# Patient Record
Sex: FEMALE | Race: OTHER
[De-identification: names, ages, dates, MRNs, and addresses within clinical notes are randomized per-mention and may not be internally consistent; named-entity substitution may affect disease eponyms.]

---

## 2017-04-10 NOTE — PDOC
History of Present Illness





- General


Chief Complaint: Cold Symptoms


Stated Complaint: RAPID HEARTBEAT, FEVER


Time Seen by Provider: 04/10/17 13:17


History Source: Patient


Exam Limitations: No Limitations





- History of Present Illness


Initial Comments: 





04/10/17 15:01


Complaint: Sore throat





Patient is a 21-year-old female with 1 day of fever, sore throat. Son has 

strep. Patient went to her doctor but because her heart rate was elevated they 

sent her to the ER. Patient has no difficulty speaking and can swallow. Patient 

does not appear ill





GENERAL/CONSTITUTIONAL: +fever, weakness. No: dizziness


HEAD, EYES, EARS, NOSE AND THROAT: No change in vision. No ear pain or 

discharge. +sore throat.


CARDIOVASCULAR: No chest pain


RESPIRATORY: No shortness of breath or cough


GASTROINTESTINAL: No pain, nausea, vomiting, diarrhea or constipation


GENITOURINARY: No dysuria


MUSCULOSKELETAL:  No neck or back pain


SKIN: No rash


NEUROLOGIC: No headache, vertigo, loss of consciousness, or loss of sensation.








GENERAL: The patient is awake, alert, and fully oriented, in no acute distress.


HEAD: Normal with no signs of trauma.


EYES: Pupils equal, round and reactive to light, sclera anicteric, conjunctiva 

clear.


ENT: pharynx: mild erythema, no exudate, uvula midline


NECK: supple


CHEST: clear, nontender, rr


ABD: soft, nontender


EXTREMITIES: Normal range of motion, no edema.


NEUROLOGICAL: Normal speech, normal gait.


SKIN: Warm, Dry





Past History





- Past Medical History


Allergies/Adverse Reactions: 


 Allergies











Allergy/AdvReac Type Severity Reaction Status Date / Time


 


No Known Allergies Allergy   Verified 04/10/17 12:21











Home Medications: 


Ambulatory Orders





Amoxicillin - [Amoxicillin 875mg Tablet -] 875 mg PO BID #20 tablet 04/10/17 








Anemia: Yes (with past pregnancy)


Asthma: No


Cancer: No


Cardiac Disorders: No


CVA: No


COPD: No


CHF: No


Dementia: No


Diabetes: No


GI Disorders: No


 Disorders: No


HTN: No


Hypercholesterolemia: No


Liver Disease: No


Seizures: No


Thyroid Disease: No





- Surgical History


Abdominal Surgery: No


Appendectomy: No


Cardiac Surgery: No


Cholecystectomy: No


Lung Surgery: No


Neurologic Surgery: No


Orthopedic Surgery: No





- Reproductive History


 (#): 5


Para: 1


Cervical CA: No


Dysfunctional Uterine Bleeding: No


Ectopic Pregnancy: No


Endometrial CA: No


Polycystic Ovaries: No


Therapeutic (s) & number: Yes (3)


Tubal Ligation: No


Spontaneous : 1





- Immunization History


Immunization Up to Date: Yes





- Psycho/Social/Smoking Cessation Hx


Anxiety: No (DENIES)


Suicidal Ideation: No


Smoking Status: No


Smoking History: Never smoked


Have you smoked in the past 12 months: No


Number of Cigarettes Smoked Daily: 0


Information on smoking cessation initiated: No


Hx Alcohol Use: No


Drug/Substance Use Hx: No


Substance Use Type: None





*Physical Exam





- Vital Signs


 Last Vital Signs











Temp Pulse Resp BP Pulse Ox


 


 100.3 F H  118 H  18   140/73   100 


 


 04/10/17 13:20  04/10/17 13:20  04/10/17 12:22  04/10/17 12:22  04/10/17 12:22














ED Treatment Course





- Medications


Given in the ED: 


ED Medications














Discontinued Medications














Generic Name Dose Route Start Last Admin





  Trade Name Phillip  PRN Reason Stop Dose Admin


 


Ibuprofen  600 mg 04/10/17 12:25 04/10/17 12:27





  Motrin Oral Suspension -  PO 04/10/17 12:26  600 mg





  NOW ONE   Administration














Medical Decision Making





- Medical Decision Making





04/10/17 15:04


Patient will have strep done, also wants flu swab done.





Strep is positive, we will discharge patient home on amoxicillin, will call her 

with flu results





3 PM: Spoke with patient regarding negative flu results and answered any other 

questions she had





*DC/Admit/Observation/Transfer


Diagnosis at time of Disposition: 


 Strep pharyngitis





- Discharge Dispostion


Disposition: HOME


Condition at time of disposition: Stable


Admit: No





- Prescriptions


Prescriptions: 


Amoxicillin - [Amoxicillin 875mg Tablet -] 875 mg PO BID #20 tablet





- Patient Instructions


Printed Discharge Instructions:  Strep Throat


Additional Instructions: 


Drink 2-3 L of water daily





Take Tylenol 650 mg every 4 hours or Motrin 600 mg every 6 hours for fever and 

pain





8 the amoxicillin one tablet every 12 hours for 10 days, do not stop it early 

even if you feel better





Return to the nearest ER if short of breath, unable to swallow or feeling sicker





Followup with your doctor in one to 2 days

## 2017-06-22 NOTE — PDOC
History of Present Illness





- General


History Source: Patient





<Nader Brunson - Last Filed: 17 00:12>





- General


History Source: Patient


Exam Limitations: No Limitations





- History of Present Illness


Initial Comments: 





17 00:14


The patient is a 21 year old female with no significant past medical history 

who presents to the ED for a small rash on the right scapula. Patient reports 

she noted a small rash a few days ago on the right scapula that is now about 

1.5cm in length and itchy. Denies any sick contacts. Denies bug bites, new 

detergents, new shampoos, or new soaps. 


 


The patient denies fever, chills, cough, SOB, chest pain, and palpitations.


The patient denies abdominal pain, nausea, vomiting, and diarrhea.


 


Allergies: NKDA


Social History: No alcohol, tobacco, or drug use reported. 


Past Surgical History: None reported


PCP: Dr. Karen Torres 








<Tatum Presley - Last Filed: 17 00:15>





- General


Chief Complaint: Rash


Stated Complaint: RASH


Time Seen by Provider: 17 00:02





Past History





- Past Medical History


Anemia: Yes (with past pregnancy)


Asthma: No


Cancer: No


Cardiac Disorders: No


CVA: No


COPD: No


CHF: No


Dementia: No


Diabetes: No


GI Disorders: No


 Disorders: No


HTN: No


Hypercholesterolemia: No


Liver Disease: No


Seizures: No


Thyroid Disease: No





- Surgical History


Abdominal Surgery: No


Appendectomy: No


Cardiac Surgery: No


Cholecystectomy: No


Lung Surgery: No


Neurologic Surgery: No


Orthopedic Surgery: No





- Reproductive History


 (#): 5


Para: 1


Cervical CA: No


Dysfunctional Uterine Bleeding: No


Ectopic Pregnancy: No


Endometrial CA: No


Polycystic Ovaries: No


Therapeutic (s) & number: Yes (3)


Tubal Ligation: No


Spontaneous : 1





- Immunization History


Immunization Up to Date: Yes





- Psycho/Social/Smoking Cessation Hx


Anxiety: No (DENIES)


Suicidal Ideation: No


Smoking Status: No


Smoking History: Never smoked


Have you smoked in the past 12 months: No


Number of Cigarettes Smoked Daily: 0


Hx Alcohol Use: No


Drug/Substance Use Hx: No


Substance Use Type: None





<Nader Brunson - Last Filed: 17 00:12>





<Tatum Presley - Last Filed: 17 00:15>





- Past Medical History


Allergies/Adverse Reactions: 


 Allergies











Allergy/AdvReac Type Severity Reaction Status Date / Time


 


No Known Allergies Allergy   Verified 17 23:16











Home Medications: 


Ambulatory Orders





Clotrimazole [Lotrimin AF] 24 gm TP TID #1 cream..g. 17 











**Review of Systems





- Review of Systems


Able to Perform ROS?: Yes


Comments:: 





17 00:14


CONSTITUTIONAL:


Absent: fever, no chills, no fatigue


EYES:


Absent: visual changes


ENT:


Absent: ear pain, no sore throat


CARDIOVASCULAR:


Absent: chest pain, no palpitations


RESPIRATORY:


Absent: cough, no SOB


GI:


Absent: abdominal pain, no nausea, no vomiting, no constipation, no diarrhea


GENITOURINARY:


Absent: dysuria, no frequency, no hematuria


MUSCULOSKELETAL:


Absent: back pain, no arthralgia, no myalgia


SKIN:


+rash on right scapula 


NEURO:


Absent: headache








<Tatum Presley - Last Filed: 17 00:15>





*Physical Exam





- Vital Signs


 Last Vital Signs











Temp Pulse Resp BP Pulse Ox


 


 98 F   74   16   124/70   100 


 


 17 23:17  17 23:17  17 23:17  17 23:17  17 23:17














<Nader Brunson - Last Filed: 17 00:12>





- Vital Signs


 Last Vital Signs











Temp Pulse Resp BP Pulse Ox


 


 98 F   74   16   124/70   100 


 


 17 23:17  17 23:17  17 23:17  17 23:17  17 23:17














- Physical Exam


Comments: 





17 00:14


GENERAL: 


Well-appearing, well-nourished. No apparent distress.


HEENT: 


Normocephalic, atraumatic. PERRL, EOM intact.


CARDIOVASCULAR: 


Normal S1, S2. Regular rate and rhythm.


PULMONARY: 


Clear to auscultation bilaterally.


ABDOMEN: 


Soft, non-distended, non-tender. 


EXTREMITIES: 


Normal ROM in all four extremities. No gross deformities.


SKIN: 


Warm, dry. 1.5 cm right scapula central clearing, no erythema surrounding the 

actual lesion.


NEUROLOGICAL: 


No focal neurological deficits.








<Tatum Presley - Last Filed: 17 00:15>





Medical Decision Making





- Medical Decision Making





17 00:12


Dr. Brunson: The scribe's documentation has been prepared under my direction 

and personally reviewed by me in its entirery. I confirm that the note above 

accurately reflects all work, treatment, procedures, and medical decision 

making performed by me.





<Nader Brunson - Last Filed: 17 00:12>





*DC/Admit/Observation/Transfer





- Discharge Dispostion


Admit: No





<Nader Brunson - Last Filed: 17 00:12>





- Attestations


Scribe Attestion: 





17 00:14





Documentation prepared by Tatum Presley, acting as medical scribe for Nader Brunson MD/DO.





<Tatum Presley - Last Filed: 17 00:15>


Diagnosis at time of Disposition: 


 Tinea corporis





- Prescriptions


Prescriptions: 


Clotrimazole [Lotrimin AF] 24 gm TP TID #1 cream..g.





- Referrals


Referrals: 


Karen Torres MD [Primary Care Provider] - 





- Patient Instructions


Printed Discharge Instructions:  DI for Tinea Corporis


Additional Instructions: 


Keep area clean and dry.  Apply cream three times daily.  Follow up with your 

doctor for dermatology referral if lesion doesn't improve.

## 2019-03-17 NOTE — PDOC
History of Present Illness





- General


Chief Complaint: Pain


Stated Complaint: ABD PAIN/BLOOD IN STOOL


Time Seen by Provider: 19 19:54





- History of Present Illness


Initial Comments: 


Connie Soni is a 22yo woman with a PMH of obesity and ovarian cysts who 

presents with mild right pelvic pain, present for several days, and BRBPR today 

that she noted with a BM. 





She states that she has an IUD, and because of the location of the pain was 

very concerned that she might have had an ectopic pregnancy. She says that she 

read online that she could possible have a "burst fallopian tube" due to the 

location of the pain. She denies any severe pain, nausea/vomiting, 

lightheadedness, vaginal discharge, or bleeding.  She states that the pain is 

not that bad, and she was going to follow up with her regular doctor next week. 

She decided to present today because she felt that she needed to have a bowel 

movement, but when she went she saw red blood in the water in the toilet bowl 

along with normal stool. She denies any recent or chronic constipation, denies 

straining, and denies any history of rectal bleeding. She did not have any pain 

associated with the incident and did not have any additional bleeding. 








Past History





- Past Medical History


Allergies/Adverse Reactions: 


 Allergies











Allergy/AdvReac Type Severity Reaction Status Date / Time


 


No Known Allergies Allergy   Verified 19 19:06











Home Medications: 


Ambulatory Orders





NK [No Known Home Medication]  18 








Anemia: Yes (with past pregnancy)


Asthma: No


Cancer: No


Cardiac Disorders: No


CVA: No


COPD: No


CHF: No


DVT: No


Dementia: No


Diabetes: No


GI Disorders: No


 Disorders: No


HTN: No


Hypercholesterolemia: No


Liver Disease: No


Seizures: No


Thyroid Disease: No





- Surgical History


Abdominal Surgery: No


Appendectomy: No


Cardiac Surgery: No


Cholecystectomy: No


Lung Surgery: No


Neurologic Surgery: No


Orthopedic Surgery: No





- Reproductive History


 (#): 5


Para: 1


Cervical CA: No


Dysfunctional Uterine Bleeding: No


Ectopic Pregnancy: No


Endometrial CA: No


Polycystic Ovaries: No


Therapeutic (s) & number: Yes (3)


Tubal Ligation: No


Spontaneous : 1





- Immunization History


Immunization Up to Date: Yes





- Suicide/Smoking/Psychosocial Hx


Smoking Status: No


Smoking History: Never smoked


Have you smoked in the past 12 months: No


Number of Cigarettes Smoked Daily: 0


Hx Alcohol Use: No


Drug/Substance Use Hx: No


Substance Use Type: None





**Review of Systems





- Review of Systems


Comments:: 


General: No fevers, no chills, no weight or appetite change, no malaise


HEENT: No changes in vision, no changes in hearing, no congestion, no sore 

throat


CV: No chest pain, no palpitations, no LE edema


Pulm: No SOB, no cough, no wheezing


GI: No nausea or vomiting, no change in bowel habits, no melena


: No frequency, no urgency, no dysuria. +suprapubic pain


Musc: No back pain, no joint swelling, no recent injury


Skin: No rash, no lesions, no erythema


Endo: No excessive thirst, no heat/cold intolerance


Heme: No unusual bruising or bleeding, no swollen glands


Neuro: No syncope, no numbness/tingling, no focal weakness


Vasc: No claudication


Psych: No recent change in mood, no SI or HI











*Physical Exam





- Vital Signs


 Last Vital Signs











Temp Pulse Resp BP Pulse Ox


 


 98.6 F   77   18   138/83   99 


 


 19 19:04  19 19:04  19 19:04  19 19:04  19 19:04














- Physical Exam


Comments: 


General: Comfortable, no acute distress, obese, appears stated age


HEENT: PERRL, EOMI, MMM, voice normal, normal neck ROM


Cards: RRR, no murmur appreciated


Pulm: Comfortable on room air, clear to auscultation bilaterally


Abd: Soft, nontender, nondistended


: No CVA tenderness. Mild right suprapubic tenderness


Rectal: Normal tone, no blood noted, no perianal lesions. Soft stool palpable 

in rectum.


Ext: Atraumatic. No LE edema. ROM intact.


Neuro: A&Ox3, CN grossly intact, normal speech, motor/sensory grossly intact 

and symmetric


Psych: Mood appropriate to situation








Moderate Sedation





- Procedure Monitoring


Vital Signs: 


Procedure Monitoring Vital Signs











Temperature  98.6 F   19 19:04


 


Pulse Rate  77   19 19:04


 


Respiratory Rate  18   19 19:04


 


Blood Pressure  138/83   19 19:04


 


O2 Sat by Pulse Oximetry (%)  99   19 19:04











Medical Decision Making





- Medical Decision Making





19 20:04


Connie Soni is a 22yo woman with a PMH of obesity and ovarian cysts who 

presents with mild right pelvic pain, present for several days, and BRBPR today 

that she noted with a BM. She presented because she read online that she could 

have an ectopic pregnancy and was afraid that her "fallopian tube burst." 


- Reassuring physical exam; mild R suprapubic TTP. VSS, no additional symptoms 

including fever, nausea, vomiting.


- Rectal exam w/o external abnormalities. No blood. No palpable hemorrhoids or 

other abnormalities. Report of blood in toilet bowl still most likely 

hemorrhoids without other abnormalities, pain, or continued bleeding. 


- Given h/o cysts, could have a ruptured ovarian cyst. Unlikely torsion given 

relatively benign presentation.  


- Will r/o UTI or ectopic as a cause of suprapubic pain. UA and urine preg sent


- Will most likely order transvaginal US for evaluation





19 20:41


- UA negative


- Urine preg negative


- Transvaginal US ordered to evaluate for cyst, torsion, or other causes of 

suprapubic pain





19 21:50


- US completed. No evidence of torsion, no free fluid. 2.2cm Left ovarian cyst 

noted. Multiple nabothian cysts in the cervix.


- Will d/c home with gyne and PMD follow up





Discussed with Dr Valencia.





Di Ashley


PGY1





*DC/Admit/Observation/Transfer


Diagnosis at time of Disposition: 


 Pelvic pain, BRBPR (bright red blood per rectum)








- Discharge Dispostion


Disposition: HOME


Condition at time of disposition: Stable


Decision to Admit order: No





- Referrals


Referrals: 


Karen Torres MD [Primary Care Provider] - 


Dale Champion MD [Staff Physician] - 





- Patient Instructions


Printed Discharge Instructions:  DI for Rectal Bleeding


Additional Instructions: 


Discharge Instructions:


You were seen in the ER for right pelvic pain and red blood during a bowel 

movement. You had a urine test to check for pregnancy and infection, and you 

also had an ultrasound to check your ovaries and pelvis. All of your results 

were normal, and your pregnancy test was negative. 





Home Care and Follow Up:


- You should continue to take any medications as previously prescribed


- You may use acetaminophen (Tylenol) 650-1000mg or ibuprofen (Advil, Motrin) 

600mg every 6-8 hours as needed for pain


- If you have additional episodes of rectal bleeding, you should keep track of 

when they occur and follow up with your doctor. Episodes of bright red bleeding 

that stop after a bowel movement without any intervention are generally benign, 

but you should be seen to make sure. Your primary doctor may recommend seeing a 

specialist


- Make an appointment to see your regular doctor for follow up within the next 

week


- Seek immediate medical care if you have worsening symptoms, severe bleeding 

that does not stop after a bowel movement, painful bleeding, severe abdominal 

pain with nausea/vomiting, or any medical emergency. 





- Post Discharge Activity

## 2019-03-17 NOTE — PDOC
Attending Attestation





- HPI


HPI: 





03/17/19 22:01


The patient is a 23 year old female with history of ovarian cysts who presents 

to the ED with complaints of right pelvic/suprapubic pain for the past few 

days. She also reports an episode of bright red blood in the toilet after a 

bowel movement this evening. Denies any associated diarrhea, nausea or 

vomiting. Denies fevers or chills. Patient also has an IUD. 





- Physicial Exam


PE: 





03/17/19 22:01


GENERAL:


Well developed, well nourished. Awake and alert. No acute distress.


CARDIOVASCULAR:


Regular rate and rhythm. No murmurs, rubs, or gallops. 


PULMONARY: 


No evidence of respiratory distress. Lungs clear to auscultation bilaterally. 


ABDOMINAL:


Soft. Right superpubic tenderness. Non-distended. No rebound or guarding. No 

organomegaly. Normoactive 


bowel sounds. 


EXTREMITIES: 


No cyanosis. No clubbing. No edema. No calf tenderness.


SKIN: 


Warm and dry. Normal capillary refill. No rashes. No jaundice. 


NEUROLOGICAL: 


Alert, awake, appropriate. Cranial nerves 2-12 intact.








- Medical Decision Making





03/17/19 22:02


Documentation prepared by Siena Mccormick, acting as medical scribe for Vickie Valencia MD.





<Siena Mccormick - Last Filed: 03/17/19 22:01>





- Resident


Resident Name: Di Ashley





- ED Attending Attestation


I have performed the following: I have examined & evaluated the patient, The 

case was reviewed & discussed with the resident, I agree w/resident's findings 

& plan, Exceptions are as noted





- Medical Decision Making








03/17/19 23:28


negative pregnancy test


UA negative


US   no torsion, no ruptured ovarian cysts, no pelvic masses


plan   pt will follow up with her gynecologist





<Vickie Valencia - Last Filed: 03/17/19 23:29>

## 2021-04-07 ENCOUNTER — HOSPITAL ENCOUNTER (EMERGENCY)
Dept: HOSPITAL 74 - JER | Age: 26
Discharge: HOME | End: 2021-04-07
Payer: COMMERCIAL

## 2021-04-07 VITALS — BODY MASS INDEX: 28.3 KG/M2

## 2021-04-07 VITALS — SYSTOLIC BLOOD PRESSURE: 124 MMHG | TEMPERATURE: 98.2 F | DIASTOLIC BLOOD PRESSURE: 79 MMHG

## 2021-04-07 VITALS — HEART RATE: 77 BPM

## 2021-04-07 DIAGNOSIS — N83.202: ICD-10-CM

## 2021-04-07 DIAGNOSIS — R10.2: Primary | ICD-10-CM

## 2021-04-07 LAB
APPEARANCE UR: CLEAR
BILIRUB UR STRIP.AUTO-MCNC: NEGATIVE MG/DL
COLOR UR: YELLOW
KETONES UR QL STRIP: NEGATIVE
LEUKOCYTE ESTERASE UR QL STRIP.AUTO: NEGATIVE
NITRITE UR QL STRIP: NEGATIVE
PH UR: 5 [PH] (ref 5–8)
PROT UR QL STRIP: NEGATIVE
PROT UR QL STRIP: NEGATIVE
SP GR UR: 1.03 (ref 1.01–1.03)
UROBILINOGEN UR STRIP-MCNC: 0.2 MG/DL (ref 0.2–1)

## 2021-09-18 ENCOUNTER — HOSPITAL ENCOUNTER (EMERGENCY)
Dept: HOSPITAL 74 - JER | Age: 26
LOS: 1 days | Discharge: HOME | End: 2021-09-19
Payer: COMMERCIAL

## 2021-09-18 VITALS — BODY MASS INDEX: 30.2 KG/M2

## 2021-09-18 DIAGNOSIS — R10.13: Primary | ICD-10-CM

## 2021-09-18 LAB
ALBUMIN SERPL-MCNC: 3.7 G/DL (ref 3.4–5)
ALP SERPL-CCNC: 57 U/L (ref 45–117)
ALT SERPL-CCNC: 17 U/L (ref 13–61)
ANION GAP SERPL CALC-SCNC: 5 MMOL/L (ref 8–16)
AST SERPL-CCNC: 9 U/L (ref 15–37)
BASOPHILS # BLD: 0.8 % (ref 0–2)
BILIRUB SERPL-MCNC: 0.3 MG/DL (ref 0.2–1)
BUN SERPL-MCNC: 14.3 MG/DL (ref 7–18)
CALCIUM SERPL-MCNC: 8.4 MG/DL (ref 8.5–10.1)
CHLORIDE SERPL-SCNC: 109 MMOL/L (ref 98–107)
CO2 SERPL-SCNC: 27 MMOL/L (ref 21–32)
CREAT SERPL-MCNC: 0.6 MG/DL (ref 0.55–1.3)
DEPRECATED RDW RBC AUTO: 13.2 % (ref 11.6–15.6)
EOSINOPHIL # BLD: 3 % (ref 0–4.5)
GLUCOSE SERPL-MCNC: 93 MG/DL (ref 74–106)
HCT VFR BLD CALC: 38.3 % (ref 32.4–45.2)
HGB BLD-MCNC: 13.1 GM/DL (ref 10.7–15.3)
LIPASE SERPL-CCNC: 100 U/L (ref 73–393)
LYMPHOCYTES # BLD: 37.7 % (ref 8–40)
MCH RBC QN AUTO: 29.5 PG (ref 25.7–33.7)
MCHC RBC AUTO-ENTMCNC: 34.1 G/DL (ref 32–36)
MCV RBC: 86.4 FL (ref 80–96)
MONOCYTES # BLD AUTO: 5.6 % (ref 3.8–10.2)
NEUTROPHILS # BLD: 52.9 % (ref 42.8–82.8)
PLATELET # BLD AUTO: 363 10^3/UL (ref 134–434)
PMV BLD: 6.7 FL (ref 7.5–11.1)
PROT SERPL-MCNC: 7.2 G/DL (ref 6.4–8.2)
RBC # BLD AUTO: 4.43 M/MM3 (ref 3.6–5.2)
SODIUM SERPL-SCNC: 141 MMOL/L (ref 136–145)
WBC # BLD AUTO: 6.8 K/MM3 (ref 4–10)

## 2021-09-18 PROCEDURE — 3E033NZ INTRODUCTION OF ANALGESICS, HYPNOTICS, SEDATIVES INTO PERIPHERAL VEIN, PERCUTANEOUS APPROACH: ICD-10-PCS

## 2021-09-19 VITALS — DIASTOLIC BLOOD PRESSURE: 79 MMHG | HEART RATE: 85 BPM | SYSTOLIC BLOOD PRESSURE: 126 MMHG | TEMPERATURE: 98.3 F

## 2022-11-12 ENCOUNTER — OFFICE (OUTPATIENT)
Dept: URBAN - METROPOLITAN AREA CLINIC 30 | Facility: CLINIC | Age: 27
Setting detail: OPHTHALMOLOGY
End: 2022-11-12
Payer: MEDICAID

## 2022-11-12 DIAGNOSIS — H35.463: ICD-10-CM

## 2022-11-12 PROBLEM — H11.133 CONJUNCTIVAL PIGMENTATIONS; BOTH EYES: Status: ACTIVE | Noted: 2022-11-12

## 2022-11-12 PROCEDURE — 92014 COMPRE OPH EXAM EST PT 1/>: CPT | Performed by: OPHTHALMOLOGY

## 2022-11-12 PROCEDURE — 92250 FUNDUS PHOTOGRAPHY W/I&R: CPT | Performed by: OPHTHALMOLOGY

## 2022-11-12 ASSESSMENT — VISUAL ACUITY
OS_BCVA: 20/25
OD_BCVA: 20/20

## 2022-11-12 ASSESSMENT — REFRACTION_MANIFEST
OD_CYLINDER: +2.25
OD_AXIS: 90
OD_SPHERE: -7.75
OS_VA1: 20/20
OD_CYLINDER: +2.50
OD_SPHERE: -7.00
OS_AXIS: 90
OS_SPHERE: -7.75
OS_SPHERE: -7.75
OS_AXIS: 95
OD_AXIS: 95
OS_VA1: 20/20-1
OD_VA1: 20/25-2
OS_CYLINDER: +2.00
OS_CYLINDER: +1.75
OD_VA1: 20/20-2

## 2022-11-12 ASSESSMENT — REFRACTION_AUTOREFRACTION
OS_SPHERE: -7.75
OD_AXIS: 90
OD_SPHERE: -7.75
OD_CYLINDER: +2.50
OS_CYLINDER: +2.00
OS_AXIS: 90

## 2022-11-12 ASSESSMENT — SPHEQUIV_DERIVED
OS_SPHEQUIV: -6.75
OD_SPHEQUIV: -6.5
OS_SPHEQUIV: -6.875
OD_SPHEQUIV: -6.5
OD_SPHEQUIV: -5.875
OS_SPHEQUIV: -6.75

## 2022-11-12 ASSESSMENT — CONFRONTATIONAL VISUAL FIELD TEST (CVF)
OS_FINDINGS: FULL
OD_FINDINGS: FULL

## 2022-11-12 ASSESSMENT — REFRACTION_CURRENTRX
OS_OVR_VA: 20/
OD_CYLINDER: +2.25
OD_OVR_VA: 20/
OS_SPHERE: -7.75
OD_SPHERE: -7.00
OD_AXIS: 90
OS_CYLINDER: +1.75
OS_AXIS: 97

## 2022-11-12 ASSESSMENT — TONOMETRY
OD_IOP_MMHG: 13
OS_IOP_MMHG: 13

## 2023-12-22 ENCOUNTER — HOSPITAL ENCOUNTER (EMERGENCY)
Dept: HOSPITAL 74 - JER | Age: 28
LOS: 1 days | Discharge: HOME | End: 2023-12-23
Payer: COMMERCIAL

## 2023-12-22 VITALS — BODY MASS INDEX: 30.2 KG/M2

## 2023-12-22 DIAGNOSIS — R10.30: ICD-10-CM

## 2023-12-22 DIAGNOSIS — Z3A.01: ICD-10-CM

## 2023-12-22 DIAGNOSIS — R10.2: ICD-10-CM

## 2023-12-22 DIAGNOSIS — O26.891: Primary | ICD-10-CM

## 2023-12-22 DIAGNOSIS — O23.41: ICD-10-CM

## 2023-12-22 LAB
ALBUMIN SERPL-MCNC: 3.5 G/DL (ref 3.4–5)
ALP SERPL-CCNC: 60 U/L (ref 45–117)
ALT SERPL-CCNC: 22 U/L (ref 13–61)
ANION GAP SERPL CALC-SCNC: 3 MMOL/L (ref 4–13)
APPEARANCE UR: CLEAR
AST SERPL-CCNC: 12 U/L (ref 15–37)
BACTERIA # UR AUTO: 460 /UL (ref 0–1359)
BASOPHILS # BLD: 1.1 % (ref 0–2)
BILIRUB SERPL-MCNC: 0.2 MG/DL (ref 0.2–1)
BILIRUB UR STRIP.AUTO-MCNC: NEGATIVE MG/DL
BUN SERPL-MCNC: 10 MG/DL (ref 7–18)
CALCIUM SERPL-MCNC: 8.8 MG/DL (ref 8.5–10.1)
CASTS URNS QL MICRO: 1 /UL (ref 0–3.1)
CHLORIDE SERPL-SCNC: 108 MMOL/L (ref 98–107)
CO2 SERPL-SCNC: 27 MMOL/L (ref 21–32)
COLOR UR: YELLOW
CREAT SERPL-MCNC: 0.8 MG/DL (ref 0.55–1.3)
DEPRECATED RDW RBC AUTO: 15.3 % (ref 11.6–15.6)
EOSINOPHIL # BLD: 0.9 % (ref 0–4.5)
EPITH CASTS URNS QL MICRO: 28 /UL (ref 0–25.1)
GLUCOSE SERPL-MCNC: 85 MG/DL (ref 74–106)
HCT VFR BLD CALC: 31.6 % (ref 32.4–45.2)
HGB BLD-MCNC: 10.4 GM/DL (ref 10.7–15.3)
KETONES UR QL STRIP: NEGATIVE
LEUKOCYTE ESTERASE UR QL STRIP.AUTO: (no result)
LYMPHOCYTES # BLD: 27.8 % (ref 8–40)
MCH RBC QN AUTO: 25.9 PG (ref 25.7–33.7)
MCHC RBC AUTO-ENTMCNC: 32.8 G/DL (ref 32–36)
MCV RBC: 78.7 FL (ref 80–96)
MONOCYTES # BLD AUTO: 7 % (ref 3.8–10.2)
NEUTROPHILS # BLD: 63.2 % (ref 42.8–82.8)
NITRITE UR QL STRIP: NEGATIVE
PH UR: 7 [PH] (ref 5–8)
PLATELET # BLD AUTO: 380 10^3/UL (ref 134–434)
PMV BLD: 7.1 FL (ref 7.5–11.1)
POTASSIUM SERPLBLD-SCNC: 4 MMOL/L (ref 3.5–5.1)
PROT SERPL-MCNC: 7.1 G/DL (ref 6.4–8.2)
PROT UR QL STRIP: NEGATIVE
PROT UR QL STRIP: NEGATIVE
RBC # BLD AUTO: 12 /UL (ref 0–23.9)
RBC # BLD AUTO: 4.01 M/MM3 (ref 3.6–5.2)
SODIUM SERPL-SCNC: 138 MMOL/L (ref 136–145)
SP GR UR: 1.02 (ref 1.01–1.03)
UROBILINOGEN UR STRIP-MCNC: 1 MG/DL (ref 0.2–1)
WBC # BLD AUTO: 9.4 K/MM3 (ref 4–10)
WBC # UR AUTO: 33 /UL (ref 0–25.8)

## 2023-12-23 VITALS
SYSTOLIC BLOOD PRESSURE: 116 MMHG | HEART RATE: 80 BPM | DIASTOLIC BLOOD PRESSURE: 74 MMHG | RESPIRATION RATE: 18 BRPM | TEMPERATURE: 97.9 F

## 2024-02-13 ENCOUNTER — HOSPITAL ENCOUNTER (EMERGENCY)
Dept: HOSPITAL 74 - JER | Age: 29
Discharge: HOME | End: 2024-02-13
Payer: COMMERCIAL

## 2024-02-13 VITALS
DIASTOLIC BLOOD PRESSURE: 63 MMHG | RESPIRATION RATE: 18 BRPM | TEMPERATURE: 98 F | SYSTOLIC BLOOD PRESSURE: 139 MMHG | HEART RATE: 97 BPM

## 2024-02-13 VITALS — BODY MASS INDEX: 32.8 KG/M2

## 2024-02-13 DIAGNOSIS — O99.511: ICD-10-CM

## 2024-02-13 DIAGNOSIS — B34.9: ICD-10-CM

## 2024-02-13 DIAGNOSIS — O99.891: Primary | ICD-10-CM

## 2024-02-13 DIAGNOSIS — Z20.822: ICD-10-CM

## 2024-02-13 DIAGNOSIS — J11.1: ICD-10-CM

## 2024-02-13 DIAGNOSIS — O21.9: ICD-10-CM

## 2024-02-13 DIAGNOSIS — R05.9: ICD-10-CM

## 2024-02-13 LAB
ALBUMIN SERPL-MCNC: 3.1 G/DL (ref 3.4–5)
ALP SERPL-CCNC: 56 U/L (ref 45–117)
ALT SERPL-CCNC: 16 U/L (ref 13–61)
ANION GAP SERPL CALC-SCNC: 8 MMOL/L (ref 4–13)
APPEARANCE UR: CLEAR
AST SERPL-CCNC: 12 U/L (ref 15–37)
BASOPHILS # BLD: 0.5 % (ref 0–2)
BILIRUB SERPL-MCNC: 0.2 MG/DL (ref 0.2–1)
BILIRUB UR STRIP.AUTO-MCNC: NEGATIVE MG/DL
BUN SERPL-MCNC: 7.1 MG/DL (ref 7–18)
CALCIUM SERPL-MCNC: 8.3 MG/DL (ref 8.5–10.1)
CHLORIDE SERPL-SCNC: 108 MMOL/L (ref 98–107)
CO2 SERPL-SCNC: 23 MMOL/L (ref 21–32)
COLOR UR: YELLOW
CREAT SERPL-MCNC: 0.5 MG/DL (ref 0.55–1.3)
DEPRECATED RDW RBC AUTO: 19.9 % (ref 11.6–15.6)
EOSINOPHIL # BLD: 1 % (ref 0–4.5)
GLUCOSE SERPL-MCNC: 100 MG/DL (ref 74–106)
HCT VFR BLD CALC: 33.6 % (ref 32.4–45.2)
HGB BLD-MCNC: 11.3 GM/DL (ref 10.7–15.3)
KETONES UR QL STRIP: NEGATIVE
LEUKOCYTE ESTERASE UR QL STRIP.AUTO: NEGATIVE
LYMPHOCYTES # BLD: 22.5 % (ref 8–40)
MCH RBC QN AUTO: 27.5 PG (ref 25.7–33.7)
MCHC RBC AUTO-ENTMCNC: 33.6 G/DL (ref 32–36)
MCV RBC: 81.8 FL (ref 80–96)
MONOCYTES # BLD AUTO: 7.1 % (ref 3.8–10.2)
NEUTROPHILS # BLD: 68.9 % (ref 42.8–82.8)
NITRITE UR QL STRIP: NEGATIVE
PH UR: 6.5 [PH] (ref 5–8)
PLATELET # BLD AUTO: 293 10^3/UL (ref 134–434)
PMV BLD: 6.8 FL (ref 7.5–11.1)
POTASSIUM SERPLBLD-SCNC: 4.2 MMOL/L (ref 3.5–5.1)
PROT SERPL-MCNC: 6.6 G/DL (ref 6.4–8.2)
PROT UR QL STRIP: NEGATIVE
PROT UR QL STRIP: NEGATIVE
RBC # BLD AUTO: 4.11 M/MM3 (ref 3.6–5.2)
SODIUM SERPL-SCNC: 139 MMOL/L (ref 136–145)
SP GR UR: 1 (ref 1.01–1.03)
UROBILINOGEN UR STRIP-MCNC: 0.2 MG/DL (ref 0.2–1)
WBC # BLD AUTO: 7.3 K/MM3 (ref 4–10)

## 2024-02-13 RX ADMIN — SODIUM CHLORIDE ONE ML: 9 INJECTION, SOLUTION INTRAVENOUS at 16:39

## 2024-03-25 ENCOUNTER — HOSPITAL ENCOUNTER (EMERGENCY)
Dept: HOSPITAL 74 - JER | Age: 29
Discharge: HOME | End: 2024-03-25
Payer: COMMERCIAL

## 2024-03-25 VITALS — DIASTOLIC BLOOD PRESSURE: 75 MMHG | HEART RATE: 92 BPM | RESPIRATION RATE: 18 BRPM | SYSTOLIC BLOOD PRESSURE: 116 MMHG

## 2024-03-25 VITALS — BODY MASS INDEX: 33.4 KG/M2

## 2024-03-25 VITALS — TEMPERATURE: 98.6 F

## 2024-03-25 DIAGNOSIS — O9A.212: Primary | ICD-10-CM

## 2024-03-25 DIAGNOSIS — O60.02: ICD-10-CM
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
## 2024-03-25 DIAGNOSIS — Z3A.20: ICD-10-CM
